# Patient Record
Sex: FEMALE | Race: WHITE | URBAN - METROPOLITAN AREA
[De-identification: names, ages, dates, MRNs, and addresses within clinical notes are randomized per-mention and may not be internally consistent; named-entity substitution may affect disease eponyms.]

---

## 2023-02-24 ENCOUNTER — OFFICE (OUTPATIENT)
Dept: URBAN - METROPOLITAN AREA CLINIC 103 | Facility: CLINIC | Age: 83
Setting detail: OPHTHALMOLOGY
End: 2023-02-24
Payer: MEDICARE

## 2023-02-24 ENCOUNTER — RX ONLY (RX ONLY)
Age: 83
End: 2023-02-24

## 2023-02-24 DIAGNOSIS — H35.3131: ICD-10-CM

## 2023-02-24 DIAGNOSIS — H16.223: ICD-10-CM

## 2023-02-24 DIAGNOSIS — H10.45: ICD-10-CM

## 2023-02-24 DIAGNOSIS — H26.40: ICD-10-CM

## 2023-02-24 DIAGNOSIS — H43.21: ICD-10-CM

## 2023-02-24 DIAGNOSIS — H43.813: ICD-10-CM

## 2023-02-24 DIAGNOSIS — H35.033: ICD-10-CM

## 2023-02-24 PROCEDURE — 99214 OFFICE O/P EST MOD 30 MIN: CPT | Performed by: OPHTHALMOLOGY

## 2023-02-24 PROCEDURE — 92250 FUNDUS PHOTOGRAPHY W/I&R: CPT | Performed by: OPHTHALMOLOGY

## 2023-02-24 ASSESSMENT — CONFRONTATIONAL VISUAL FIELD TEST (CVF)
OD_FINDINGS: FULL
OS_FINDINGS: FULL

## 2023-02-24 ASSESSMENT — REFRACTION_AUTOREFRACTION
OS_SPHERE: +0.75
OD_SPHERE: +1.25
OD_CYLINDER: -1.75
OS_CYLINDER: -1.50
OS_AXIS: 092
OD_AXIS: 089

## 2023-02-24 ASSESSMENT — SUPERFICIAL PUNCTATE KERATITIS (SPK)
OS_SPK: ABSENT
OD_SPK: ABSENT

## 2023-02-24 ASSESSMENT — KERATOMETRY
OS_K2POWER_DIOPTERS: 46.50
OD_K1POWER_DIOPTERS: 45.00
OS_AXISANGLE_DEGREES: 017
OD_AXISANGLE_DEGREES: 173
OS_K1POWER_DIOPTERS: 45.50
OD_K2POWER_DIOPTERS: 46.00

## 2023-02-24 ASSESSMENT — VISUAL ACUITY
OS_BCVA: 20/40+2
OD_BCVA: 20/60+

## 2023-02-24 ASSESSMENT — SPHEQUIV_DERIVED
OD_SPHEQUIV: 0.375
OS_SPHEQUIV: 0

## 2023-02-24 ASSESSMENT — LID EXAM ASSESSMENTS
OS_MEIBOMITIS: LUL 1+
OD_MEIBOMITIS: RUL 1+

## 2023-02-24 ASSESSMENT — AXIALLENGTH_DERIVED
OS_AL: 22.708
OD_AL: 22.7429

## 2023-04-03 PROBLEM — Z00.00 ENCOUNTER FOR PREVENTIVE HEALTH EXAMINATION: Status: ACTIVE | Noted: 2023-04-03

## 2023-04-13 ENCOUNTER — APPOINTMENT (OUTPATIENT)
Dept: NEUROLOGY | Facility: CLINIC | Age: 83
End: 2023-04-13
Payer: MEDICARE

## 2023-04-13 VITALS
SYSTOLIC BLOOD PRESSURE: 120 MMHG | BODY MASS INDEX: 30.13 KG/M2 | HEIGHT: 67 IN | HEART RATE: 70 BPM | OXYGEN SATURATION: 99 % | WEIGHT: 192 LBS | DIASTOLIC BLOOD PRESSURE: 68 MMHG

## 2023-04-13 PROCEDURE — 99204 OFFICE O/P NEW MOD 45 MIN: CPT

## 2023-04-13 NOTE — HISTORY OF PRESENT ILLNESS
[FreeTextEntry1] : 82 YO F who was previously followed up by Dr. Maciel for spinal stenosis and hx of an ischemic stroke is here to establish care with a new neurologist as her previous neurologist moved. Pt reports lower back pain for over 10 years, she was diagnosed with spinal stenosis and peripheral neuropathy. Initially was thought to be due to Lyme disease, pt underwent treatment with IV antibiotics for a couple of weeks but that did not improve her symptoms. Pt was previous on Gabapentin 100mg TID, then 200mg TID, and went up to 300mg TID with minimum relief.\par She previously saw a spine surgeon in [place] who referred her to a physiatrist who performed multiple spine injections, with minimum to no relief. \par Pt takes up to 3 Alleve per day with some pain relief

## 2023-04-13 NOTE — PHYSICAL EXAM
[FreeTextEntry1] : awake, alert, oriented x3\par Speech is fluent, language appropriate\par HIGINIO, EOMI, Face symmetric, facial sensation intact to LT thoughout\par BLUEs 5/5 proximally and distally\par BLLEs hip flexion/extension 4/5, knee flexion/extension 4/5, plantar/dorsiflexion 4/5\par sensation intact to LT throughout except BLLEs past the knee decrease sensation. \par decrease crude touch/proprioception in BLLEs\par Romberg +\par Gait: walks with a cane, wide based

## 2023-04-13 NOTE — ASSESSMENT
[FreeTextEntry1] : 84 YO F w/hx of spinal stenosis and Lyme disease - s/p doxy treatment. Pt is still having severe pain and numbness sensation in her feet.\par - recommend PT - pt has not been in PT for over 1 year\par - will refer to spine surgeon Dr. Espinal for evaluation of the stenosis\par - pt can take Ibuprophen 400-600mg up to 3 times per day for the pain\par - pt was on Gabapendin in the past but pt had an adverse effect and stopped it\par follow up in 3 months and PRN

## 2023-04-21 ENCOUNTER — APPOINTMENT (OUTPATIENT)
Dept: NEUROSURGERY | Facility: CLINIC | Age: 83
End: 2023-04-21
Payer: MEDICARE

## 2023-04-21 VITALS
SYSTOLIC BLOOD PRESSURE: 110 MMHG | DIASTOLIC BLOOD PRESSURE: 68 MMHG | BODY MASS INDEX: 30.61 KG/M2 | WEIGHT: 195 LBS | HEIGHT: 67 IN

## 2023-04-21 PROCEDURE — 99204 OFFICE O/P NEW MOD 45 MIN: CPT

## 2023-04-21 NOTE — HISTORY OF PRESENT ILLNESS
[de-identified] : \emilia Carrera is an 83-year-old here accompanied by her  for evaluation of her lumbar spine.  She has a history of partial foot drop for several years duration and lumbar radiculopathy on the right.  Is unclear whether or not she was ever diagnosed with a radiculopathy versus neuropathy as she states she had EMG studies and a full work-up performed in the past.  She is seen multiple doctors as she acquired Lyme disease and was treated by the [facility] for such.  She was treated at [facility] for a while and as of now has not spoken to a surgeon.  She has an MRI study from 2021 for my review along with some older ones.  None of the electrodiagnostic studies are available for my review.  She has symptoms of radiculopathy on the right and some imbalance.  She states that she is unable to walk when she cannot see the ground.  She is using a cane for ambulation currently and just started physical therapy.  She apparently had a facial droop on the right several years ago and the question is whether she had a stroke at that time is unclear imaging suggest microvascular disease from that period of time is consistent with the possibility of subacute or acute stroke however she responded to steroids and time the facial droop improved suggesting it may have been a Bell's palsy instead.\par \par \par SID Myers\par neuro  Willian\par PM  Yuridia\par \par

## 2023-04-21 NOTE — RESULTS/DATA
[FreeTextEntry1] : \par Series of [facility] MRIs were reviewed patient ID [id number] she has several studies of the spine performed essentially showing a grade 1 spondylolisthesis at L4-5 with extremely severe stenosis on the right with combination of facet arthropathy and disc herniation she has multilevel spondylosis otherwise.

## 2023-04-21 NOTE — PHYSICAL EXAM
[Motor Tone] : muscle tone was normal in all four extremities [Motor Strength] : muscle strength was normal in all four extremities [Romberg's Sign] : a positive Romberg's sign was present [Limited Balance] : the patient's balance was impaired [FreeTextEntry6] : She has weakness of dorsiflexion of the right lower extremity as well as trace weakness of the quadriceps.

## 2023-05-08 ENCOUNTER — APPOINTMENT (OUTPATIENT)
Dept: NEUROSURGERY | Facility: CLINIC | Age: 83
End: 2023-05-08
Payer: MEDICARE

## 2023-05-08 VITALS — WEIGHT: 195 LBS | HEIGHT: 67 IN | BODY MASS INDEX: 30.61 KG/M2

## 2023-05-08 DIAGNOSIS — M54.16 RADICULOPATHY, LUMBAR REGION: ICD-10-CM

## 2023-05-08 DIAGNOSIS — M48.061 SPINAL STENOSIS, LUMBAR REGION WITHOUT NEUROGENIC CLAUDICATION: ICD-10-CM

## 2023-05-08 PROCEDURE — 99213 OFFICE O/P EST LOW 20 MIN: CPT

## 2023-05-08 NOTE — RESULTS/DATA
[FreeTextEntry1] : \par EMG nerve conduction studies done by Dr. Valdes suggest a peripheral neuropathy in the lower extremities as I anticipated.

## 2023-05-08 NOTE — ASSESSMENT
[FreeTextEntry1] : \par This is a patient with lumbar stenosis.  We discussed lumbar decompression and fusion as a treatment option however in the presence of the significant peripheral neuropathy I will think surgery is warranted and will not benefit her.  We discussed all of this in significant detail today in the office and is agreeable to remain conservative.\par \par I think pain management and varying capacities is reasonable because she says that that has helped in terms of the medication treatments.  Injections have been less effective than rightly so given the fact that the spinal pathology is probably not the primary pain generator.  If it is truly peripheral neuropathy there may be other alternatives whether they be medication or procedures based.

## 2023-05-08 NOTE — REASON FOR VISIT
[Follow-Up: _____] : a [unfilled] follow-up visit [FreeTextEntry1] : \emilia Carrera is here with her  to review her electrodiagnostic studies.  She does have lumbar stenosis and we discussed the possibility of lumbar decompression as a treatment option.  The EMG study however suggest significant peripheral neuropathy which does change things.

## 2023-09-12 ENCOUNTER — OFFICE (OUTPATIENT)
Dept: URBAN - METROPOLITAN AREA CLINIC 103 | Facility: CLINIC | Age: 83
Setting detail: OPHTHALMOLOGY
End: 2023-09-12
Payer: MEDICARE

## 2023-09-12 ENCOUNTER — RX ONLY (RX ONLY)
Age: 83
End: 2023-09-12

## 2023-09-12 DIAGNOSIS — H35.3131: ICD-10-CM

## 2023-09-12 DIAGNOSIS — H16.223: ICD-10-CM

## 2023-09-12 DIAGNOSIS — H10.433: ICD-10-CM

## 2023-09-12 DIAGNOSIS — H26.491: ICD-10-CM

## 2023-09-12 PROCEDURE — 99213 OFFICE O/P EST LOW 20 MIN: CPT | Performed by: OPHTHALMOLOGY

## 2023-09-12 PROCEDURE — 92134 CPTRZ OPH DX IMG PST SGM RTA: CPT | Performed by: OPHTHALMOLOGY

## 2023-09-12 ASSESSMENT — TONOMETRY
OS_IOP_MMHG: 14
OD_IOP_MMHG: 11

## 2023-09-12 ASSESSMENT — CORNEAL EDEMA CLINICAL DESCRIPTION
OS_CORNEALEDEMA: ABSENT
OD_CORNEALEDEMA: ABSENT

## 2023-09-12 ASSESSMENT — SUPERFICIAL PUNCTATE KERATITIS (SPK)
OD_SPK: ABSENT
OS_SPK: ABSENT

## 2023-09-12 ASSESSMENT — LID EXAM ASSESSMENTS
OS_MEIBOMITIS: LUL 1+
OD_MEIBOMITIS: RUL 1+

## 2023-09-12 ASSESSMENT — REFRACTION_AUTOREFRACTION
OD_AXIS: 091
OS_CYLINDER: -1.50
OD_SPHERE: +1.25
OS_AXIS: 095
OS_SPHERE: +0.75
OD_CYLINDER: -2.25

## 2023-09-12 ASSESSMENT — KERATOMETRY
OD_K1POWER_DIOPTERS: 45.25
OD_K2POWER_DIOPTERS: 46.25
OS_AXISANGLE_DEGREES: 008
OS_K2POWER_DIOPTERS: 47.00
OD_AXISANGLE_DEGREES: 173
OS_K1POWER_DIOPTERS: 46.00

## 2023-09-12 ASSESSMENT — CORNEAL DYSTROPHY - POSTERIOR
OS_POSTERIORDYSTROPHY: 2+ GUTTATA
OD_POSTERIORDYSTROPHY: 2+ GUTTATA

## 2023-09-12 ASSESSMENT — VISUAL ACUITY
OD_BCVA: 20/40-1
OS_BCVA: 20/50-1

## 2023-09-12 ASSESSMENT — AXIALLENGTH_DERIVED
OD_AL: 22.7481
OS_AL: 22.5392

## 2023-09-12 ASSESSMENT — SPHEQUIV_DERIVED
OD_SPHEQUIV: 0.125
OS_SPHEQUIV: 0

## 2024-03-12 ENCOUNTER — OFFICE (OUTPATIENT)
Dept: URBAN - METROPOLITAN AREA CLINIC 103 | Facility: CLINIC | Age: 84
Setting detail: OPHTHALMOLOGY
End: 2024-03-12
Payer: MEDICARE

## 2024-03-12 DIAGNOSIS — H43.813: ICD-10-CM

## 2024-03-12 DIAGNOSIS — H35.033: ICD-10-CM

## 2024-03-12 DIAGNOSIS — H26.491: ICD-10-CM

## 2024-03-12 DIAGNOSIS — H43.21: ICD-10-CM

## 2024-03-12 DIAGNOSIS — H35.3131: ICD-10-CM

## 2024-03-12 DIAGNOSIS — H16.223: ICD-10-CM

## 2024-03-12 DIAGNOSIS — H10.433: ICD-10-CM

## 2024-03-12 PROCEDURE — 92250 FUNDUS PHOTOGRAPHY W/I&R: CPT | Performed by: OPHTHALMOLOGY

## 2024-03-12 PROCEDURE — 92014 COMPRE OPH EXAM EST PT 1/>: CPT | Performed by: OPHTHALMOLOGY

## 2024-03-12 ASSESSMENT — LID EXAM ASSESSMENTS
OD_MEIBOMITIS: RUL 1+
OS_MEIBOMITIS: LUL 1+

## 2024-10-16 ENCOUNTER — OFFICE (OUTPATIENT)
Dept: URBAN - METROPOLITAN AREA CLINIC 103 | Facility: CLINIC | Age: 84
Setting detail: OPHTHALMOLOGY
End: 2024-10-16
Payer: MEDICARE

## 2024-10-16 DIAGNOSIS — H35.3131: ICD-10-CM

## 2024-10-16 DIAGNOSIS — H02.135: ICD-10-CM

## 2024-10-16 DIAGNOSIS — H02.132: ICD-10-CM

## 2024-10-16 DIAGNOSIS — H26.491: ICD-10-CM

## 2024-10-16 DIAGNOSIS — H16.223: ICD-10-CM

## 2024-10-16 DIAGNOSIS — H10.433: ICD-10-CM

## 2024-10-16 PROCEDURE — 92134 CPTRZ OPH DX IMG PST SGM RTA: CPT | Performed by: OPHTHALMOLOGY

## 2024-10-16 PROCEDURE — 99213 OFFICE O/P EST LOW 20 MIN: CPT | Performed by: OPHTHALMOLOGY

## 2024-10-16 ASSESSMENT — KERATOMETRY
OD_K2POWER_DIOPTERS: 46.25
OD_K1POWER_DIOPTERS: 45.25
OS_AXISANGLE_DEGREES: 011
OS_K2POWER_DIOPTERS: 47.25
OD_AXISANGLE_DEGREES: 171
OS_K1POWER_DIOPTERS: 46.25

## 2024-10-16 ASSESSMENT — TONOMETRY
OD_IOP_MMHG: 10
OS_IOP_MMHG: 13

## 2024-10-16 ASSESSMENT — REFRACTION_AUTOREFRACTION
OD_AXIS: 087
OS_AXIS: 099
OD_CYLINDER: -1.75
OD_SPHERE: +1.25
OS_SPHERE: +0.50
OS_CYLINDER: -1.50

## 2024-10-16 ASSESSMENT — LID POSITION - ECTROPION
OD_ECTROPION: RLL 2+
OS_ECTROPION: LLL 1+

## 2024-10-16 ASSESSMENT — VISUAL ACUITY
OS_BCVA: 20/60-2
OD_BCVA: 20/60

## 2024-10-16 ASSESSMENT — CORNEAL DYSTROPHY - POSTERIOR
OS_POSTERIORDYSTROPHY: 2+ GUTTATA
OD_POSTERIORDYSTROPHY: 2+ GUTTATA

## 2024-10-16 ASSESSMENT — LID EXAM ASSESSMENTS
OD_MEIBOMITIS: RUL 2+
OS_MEIBOMITIS: LUL 2+

## 2024-10-16 ASSESSMENT — SUPERFICIAL PUNCTATE KERATITIS (SPK)
OD_SPK: 2+
OS_SPK: 2+

## 2025-04-16 ENCOUNTER — OFFICE (OUTPATIENT)
Dept: URBAN - METROPOLITAN AREA CLINIC 103 | Facility: CLINIC | Age: 85
Setting detail: OPHTHALMOLOGY
End: 2025-04-16
Payer: MEDICARE

## 2025-04-16 DIAGNOSIS — H26.491: ICD-10-CM

## 2025-04-16 DIAGNOSIS — B88.0: ICD-10-CM

## 2025-04-16 DIAGNOSIS — H35.033: ICD-10-CM

## 2025-04-16 DIAGNOSIS — H02.135: ICD-10-CM

## 2025-04-16 DIAGNOSIS — H43.813: ICD-10-CM

## 2025-04-16 DIAGNOSIS — H43.21: ICD-10-CM

## 2025-04-16 DIAGNOSIS — H10.433: ICD-10-CM

## 2025-04-16 DIAGNOSIS — H01.004: ICD-10-CM

## 2025-04-16 DIAGNOSIS — H02.132: ICD-10-CM

## 2025-04-16 DIAGNOSIS — H35.3131: ICD-10-CM

## 2025-04-16 DIAGNOSIS — H16.223: ICD-10-CM

## 2025-04-16 DIAGNOSIS — H01.001: ICD-10-CM

## 2025-04-16 PROCEDURE — 92250 FUNDUS PHOTOGRAPHY W/I&R: CPT | Performed by: OPHTHALMOLOGY

## 2025-04-16 PROCEDURE — 92014 COMPRE OPH EXAM EST PT 1/>: CPT | Performed by: OPHTHALMOLOGY

## 2025-04-16 ASSESSMENT — TONOMETRY
OS_IOP_MMHG: 12
OS_IOP_MMHG: 13
OD_IOP_MMHG: 10
OD_IOP_MMHG: 12

## 2025-04-16 ASSESSMENT — REFRACTION_AUTOREFRACTION
OS_AXIS: 103
OD_CYLINDER: -2.25
OS_CYLINDER: -1.75
OD_SPHERE: +1.75
OD_AXIS: 085
OS_SPHERE: +1.00

## 2025-04-16 ASSESSMENT — CORNEAL DYSTROPHY - POSTERIOR
OS_POSTERIORDYSTROPHY: 2+ GUTTATA
OD_POSTERIORDYSTROPHY: 2+ GUTTATA

## 2025-04-16 ASSESSMENT — KERATOMETRY
OS_K2POWER_DIOPTERS: 11046.8
OD_K2POWER_DIOPTERS: 46.00
OD_K1POWER_DIOPTERS: 44.75
OD_AXISANGLE_DEGREES: 176
OS_K1POWER_DIOPTERS: 45.75
OS_AXISANGLE_DEGREES: 020

## 2025-04-16 ASSESSMENT — CONFRONTATIONAL VISUAL FIELD TEST (CVF)
OS_FINDINGS: FULL
OD_FINDINGS: FULL

## 2025-04-16 ASSESSMENT — VISUAL ACUITY
OD_BCVA: 20/70
OS_BCVA: 20/70

## 2025-04-16 ASSESSMENT — LID EXAM ASSESSMENTS
OD_MEIBOMITIS: RUL 2+
OS_BLEPHARITIS: 1+ 2+
OD_BLEPHARITIS: 2+ 3+
OS_MEIBOMITIS: LUL 2+

## 2025-04-16 ASSESSMENT — SUPERFICIAL PUNCTATE KERATITIS (SPK)
OD_SPK: 2+
OS_SPK: 2+

## 2025-04-16 ASSESSMENT — LID POSITION - ECTROPION
OD_ECTROPION: RLL 2+
OS_ECTROPION: LLL 1+